# Patient Record
Sex: FEMALE | Race: WHITE | NOT HISPANIC OR LATINO | Employment: FULL TIME | URBAN - METROPOLITAN AREA
[De-identification: names, ages, dates, MRNs, and addresses within clinical notes are randomized per-mention and may not be internally consistent; named-entity substitution may affect disease eponyms.]

---

## 2023-06-10 ENCOUNTER — APPOINTMENT (EMERGENCY)
Dept: RADIOLOGY | Facility: HOSPITAL | Age: 58
End: 2023-06-10
Payer: COMMERCIAL

## 2023-06-10 ENCOUNTER — HOSPITAL ENCOUNTER (EMERGENCY)
Facility: HOSPITAL | Age: 58
Discharge: HOME | End: 2023-06-10
Attending: EMERGENCY MEDICINE | Admitting: EMERGENCY MEDICINE
Payer: COMMERCIAL

## 2023-06-10 VITALS
RESPIRATION RATE: 20 BRPM | OXYGEN SATURATION: 100 % | DIASTOLIC BLOOD PRESSURE: 74 MMHG | HEIGHT: 65 IN | SYSTOLIC BLOOD PRESSURE: 130 MMHG | WEIGHT: 172 LBS | TEMPERATURE: 97.7 F | HEART RATE: 72 BPM | BODY MASS INDEX: 28.66 KG/M2

## 2023-06-10 DIAGNOSIS — S69.92XA INJURY OF LEFT THUMB, INITIAL ENCOUNTER: Primary | ICD-10-CM

## 2023-06-10 PROCEDURE — 99283 EMERGENCY DEPT VISIT LOW MDM: CPT

## 2023-06-10 PROCEDURE — 73110 X-RAY EXAM OF WRIST: CPT

## 2023-06-10 PROCEDURE — 96372 THER/PROPH/DIAG INJ SC/IM: CPT

## 2023-06-10 PROCEDURE — 73140 X-RAY EXAM OF FINGER(S): CPT

## 2023-06-10 RX ORDER — ACETAMINOPHEN 325 MG/1
975 TABLET ORAL ONCE
Status: COMPLETED | OUTPATIENT
Start: 2023-06-10 | End: 2023-06-10

## 2023-06-10 RX ORDER — KETOROLAC TROMETHAMINE 30 MG/ML
15 INJECTION, SOLUTION INTRAMUSCULAR; INTRAVENOUS ONCE
Status: COMPLETED | OUTPATIENT
Start: 2023-06-10 | End: 2023-06-10

## 2023-06-10 RX ADMIN — KETOROLAC TROMETHAMINE 15 MG: 30 INJECTION, SOLUTION INTRAMUSCULAR at 14:31

## 2023-06-10 RX ADMIN — ACETAMINOPHEN 975 MG: 325 TABLET, FILM COATED ORAL at 15:50

## 2023-06-10 NOTE — ED PROVIDER NOTES
"History  Chief Complaint   Patient presents with   • Thumb Pain     Reports was moving suitcase and fell back onto L thumb  States \"thumb went back and touched my wrist\" unable to move thumb  States \"sharp/dull pain\"     Spring Beebe is a 62year old female PMHx left thumb fracture presenting to the ED with left thumb pain x injury earlier today  Was lifting a suitcase when the thumb bent backwards and \"touched her forearm  \" The pain is constant, located over the snuffbox and tendon  Described as sharp and dull  Reports feeling a pop at the time  Can slightly move the thumb but this causes a lot of pain  Iced the area and took Aleve without relief of symptoms  Holding the thumb over the heart level  States even the slightest of bumps brings her to her knees  Denies radiation, paresthesia  None       History reviewed  No pertinent past medical history  Past Surgical History:   Procedure Laterality Date   • APPENDECTOMY     • HYSTERECTOMY         History reviewed  No pertinent family history  I have reviewed and agree with the history as documented  E-Cigarette/Vaping     E-Cigarette/Vaping Substances     Social History     Tobacco Use   • Smoking status: Never   • Smokeless tobacco: Never   Substance Use Topics   • Alcohol use: Yes   • Drug use: Never       Review of Systems   Constitutional: Negative for chills and fever  Eyes: Negative for photophobia and visual disturbance  Respiratory: Negative for cough and shortness of breath  Cardiovascular: Negative for chest pain and palpitations  Gastrointestinal: Negative for nausea and vomiting  Musculoskeletal: Positive for arthralgias  Negative for joint swelling  Skin: Negative for color change, rash and wound  Neurological: Positive for weakness  Negative for tremors, light-headedness, numbness and headaches  Physical Exam  Physical Exam  Vitals reviewed  Constitutional:       General: She is not in acute distress       Appearance: " Normal appearance  She is ill-appearing  She is not toxic-appearing or diaphoretic  Comments:   Pt appears to be in pain, holding the wrist and resting the hand on an ice pack  HENT:      Head: Normocephalic and atraumatic  Right Ear: External ear normal       Left Ear: External ear normal       Nose: Nose normal    Eyes:      General: No scleral icterus  Right eye: No discharge  Left eye: No discharge  Extraocular Movements: Extraocular movements intact  Conjunctiva/sclera: Conjunctivae normal    Cardiovascular:      Rate and Rhythm: Normal rate and regular rhythm  Pulses: Normal pulses  Radial pulses are 2+ on the right side and 2+ on the left side  Heart sounds: Normal heart sounds  Pulmonary:      Effort: Pulmonary effort is normal  No respiratory distress  Breath sounds: Normal breath sounds  Musculoskeletal:      Right elbow: Normal       Left elbow: Normal       Right forearm: Normal       Left forearm: Normal       Right wrist: Normal       Left wrist: Normal       Right hand: Normal       Left hand: Tenderness and bony tenderness present  No swelling, deformity or lacerations  Decreased range of motion (due to pain)  Normal sensation  Normal capillary refill  Normal pulse  Cervical back: Normal range of motion and neck supple  Comments:   Left hand findings are exclusive to the first finger/thumb  All other portions of the hand were unremarkable  There is significant tenderness to the snuffbox, and to the tendons surrounding the thumb extending into the radius  ROM decreased due to pain but some movement is intact  Neurovascularly is intact  No ecchymosis, laceration, swelling noted  Skin:     General: Skin is warm and dry  Capillary Refill: Capillary refill takes less than 2 seconds  Neurological:      General: No focal deficit present  Mental Status: She is alert  Sensory: Sensation is intact     Psychiatric: Mood and Affect: Mood normal          Behavior: Behavior normal          Vital Signs  ED Triage Vitals   Temperature Pulse Respirations Blood Pressure SpO2   06/10/23 1320 06/10/23 1320 06/10/23 1320 06/10/23 1320 06/10/23 1320   97 7 °F (36 5 °C) 72 20 130/74 100 %      Temp Source Heart Rate Source Patient Position - Orthostatic VS BP Location FiO2 (%)   06/10/23 1320 06/10/23 1320 -- 06/10/23 1320 --   Oral Monitor  Right arm       Pain Score       06/10/23 1550       8           Vitals:    06/10/23 1320   BP: 130/74   Pulse: 72         Visual Acuity      ED Medications  Medications   ketorolac (TORADOL) injection 15 mg (15 mg Intramuscular Given 6/10/23 1431)   acetaminophen (TYLENOL) tablet 975 mg (975 mg Oral Given 6/10/23 1550)       Diagnostic Studies  Results Reviewed     None                 XR wrist 3+ views LEFT   ED Interpretation by Guillermo Menchaca PA-C (06/10 1444)   No acute osseous abnormality  XR thumb first digit-min 2 views LEFT   ED Interpretation by Guillermo Menchaca PA-C (06/10 1444)   No acute osseous abnormality  Procedures  Splint application    Date/Time: 6/10/2023 4:00 PM    Performed by: Guillermo Menchaca PA-C  Authorized by: Guillermo Menchaca PA-C  [place] Protocol:  Procedure performed by: (ED technician )  Consent: Verbal consent obtained    Risks and benefits: risks, benefits and alternatives were discussed  Consent given by: patient  Patient understanding: patient states understanding of the procedure being performed  Patient identity confirmed: verbally with patient and arm band      Pre-procedure details:     Sensation:  Normal    Skin color:  Pink  Procedure details:     Laterality:  Left    Location:  Wrist    Wrist:  L wrist    Splint type:  Thumb spica    Supplies:  Cotton padding, 3 layer wrap, skin protective strip and Ortho-Glass  Post-procedure details:     Pain:  Improved    Sensation:  Normal    Skin color: Pink    Patient tolerance of procedure: Tolerated well, no immediate complications  Comments:      Patient initially with pain to hold the thumb properly, but once splinted was okay  Sensation and capillary refill is intact after the procedure  ED Course  ED Course as of 06/10/23 1912   Sat Garett 10, 2023   1444 XR wrist 3+ views LEFT  Per my interpretation: No acute osseous abnormality  1444 XR thumb first digit-min 2 views LEFT  Per my interpretation: No acute osseous abnormality  Medical Decision Making  62year old female presenting with significant left thumb pain after bending it backwards earlier today  Please see physical exam for findings  Per my interpretation the wrist and thumb x rays were without acute osseous abnormality  Discussed with the patient the placement of a thumb spica brace vs  thumb spica splint  Due to significant pain over the snuffbox, opted for thumb spica splint with orthopedic referral due to being unable to rule out scaphoid fracture  The history and physical exam findings more consistent with tendonous injury per my clinical opinion at this time  Patient given Toradol IM and Tylenol for pain control  Tolerated the splinting application well and maintained neurovascular status  Supportive care discussed with the patient  Pt stable at time of discharge, vital signs reviewed, questions answered  Strict ER return precautions provided/discussed and were well understood by patient  Injury of left thumb, initial encounter: acute illness or injury     Details: Suspect tendonous injury, but due to snuffbox tenderness, could not rule out fracture completely  Amount and/or Complexity of Data Reviewed  External Data Reviewed:      Details: Patient is from [place] here for a few weeks, no history to review on file  Radiology: ordered and independent interpretation performed  Decision-making details documented in ED Course  Details: Per my interpretation, the wrist and thumb x rays are without acute osseous abnormality  Risk  OTC drugs  Prescription drug management  Disposition  Final diagnoses:   Injury of left thumb, initial encounter     Time reflects when diagnosis was documented in both MDM as applicable and the Disposition within this note     Time User Action Codes Description Comment    6/10/2023  2:53 PM Alexandru Santos Add [[id number]] Injury of left thumb, initial encounter       ED Disposition     ED Disposition   Discharge    Condition   Stable    Date/Time   Sat Garett 10, 2023  3:01 PM    Comment   Jolene Mis discharge to home/self care  Follow-up Information     Follow up With Specialties Details Why Contact Info Additional [address] Emergency Department Emergency Medicine Go to  If symptoms worsen [address] [facility] [id number] [place] [address] Emergency Department, [address], [place], [place], [address] [facility] Orthopedic Surgery Schedule an appointment as soon as possible for a visit in 1 week  [address] [id number]  [phone] [address] [place] [address] [address] [facility], [place], [address]  Use Entrance A           There are no discharge medications for this patient            PDMP Review     None          ED Provider  Electronically Signed by           Padma Peterson PA-C  06/10/23 5096

## 2023-06-10 NOTE — DISCHARGE INSTRUCTIONS
Keep the splint on until cleared by an orthopedic  I have provided a referral and a number for the [place] office but you can select any office nearby  Take Tylenol and Ibuprofen rotationally to keep up with pain relief  The splint should not get wet, please wear a plastic covering/bag when you shower, try to avoid it getting wet  Return to the ER for worsening pain, swelling, unable to feel the fingers, fingers turn blue/black, fingers turn cold